# Patient Record
Sex: MALE | Race: WHITE | NOT HISPANIC OR LATINO | Employment: UNEMPLOYED | URBAN - METROPOLITAN AREA
[De-identification: names, ages, dates, MRNs, and addresses within clinical notes are randomized per-mention and may not be internally consistent; named-entity substitution may affect disease eponyms.]

---

## 2024-10-21 ENCOUNTER — HOSPITAL ENCOUNTER (OUTPATIENT)
Dept: TELEMEDICINE | Facility: HOSPITAL | Age: 69
Discharge: HOME | End: 2024-10-21
Payer: MEDICARE

## 2024-10-21 NOTE — SUBJECTIVE & OBJECTIVE
HPI:  69 y.o. male with sudden onset L arm weakness and numbness.   No signs of LVO on exam.  Onset about 2 hours prior to presentation.    Images personally reviewed and interpreted:  Study: Head CT  Study Interpretation: No acute findings     Assessment and plan:  Likely small vessel stroke  Inside lytic window, recommend tx once BP is under control.    Lytics recommendation: Recommend IV Tenecteplase 0.25mg/kg IV push (max dose 25mg); If Tenecteplase is not available use Alteplase 0.9mg/kg IV bolus followed by infusion (max dose 90mg)     BP goal prior to IV thrombolytics - <185/110 - Initiate BP management prior to treatment if not at goal    BP goal post IV thrombolytics - <180/105 for at least 24 hrs. - Continue BP management to maintain goal    Additional Recommendations:   Neurological assessment and vital signs (except temperature) every 15 minutes x 2 hours, then every 30 minutes x 6 hours, then every hour x 16 hours..  Frequency of BP assessments may need to be increased if systolic BP stays >= 180 mm Hg or diastolic BP stays >= 105 mm Hg. Administer antihypertensive meds as ordered  Temperature every 4 hours or as required.  Follow hospital protocol for further orders re: post thrombolytic therapy patient management.  No antithrombotics or anticoagulants (including but not limited to: heparin, warfarin, aspirin, clopidigrel, or dipyridamole) for 24 hours, then start antithrombotics as ordered by treating physician    Adapted from the American Heart Association/American Stroke Association (AHA/ASA) and American Association of Neuroscience Nurses (AANN) Guidelines.       Clinical Delays to Decision to Treat: None    Thrombectomy recommendation: No; at this time symptoms not suggestive of large vessel occlusion  Placement recommendation: admit to inpatient
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
## 2025-01-01 PROBLEM — R90.89 ABNORMAL CT OF BRAIN: Status: ACTIVE | Noted: 2025-01-01

## 2025-01-01 PROBLEM — I63.9 ACUTE ISCHEMIC STROKE: Status: ACTIVE | Noted: 2025-01-01

## 2025-01-03 PROBLEM — E11.9 DIABETES: Status: ACTIVE | Noted: 2025-01-03

## 2025-01-04 PROBLEM — I65.22 STENOSIS OF LEFT CAROTID ARTERY WITHOUT CEREBRAL INFARCTION: Status: ACTIVE | Noted: 2025-01-04

## 2025-01-04 PROBLEM — I63.511 ACUTE ISCHEMIC RIGHT MIDDLE CEREBRAL ARTERY (MCA) STROKE: Status: ACTIVE | Noted: 2025-01-01

## 2025-01-04 PROBLEM — I67.89 HEMIPARESIS OF LEFT NONDOMINANT SIDE DUE TO ACUTE CEREBROVASCULAR DISEASE: Status: ACTIVE | Noted: 2025-01-04

## 2025-01-04 PROBLEM — I10 PRIMARY HYPERTENSION: Status: ACTIVE | Noted: 2025-01-04

## 2025-01-04 PROBLEM — Z98.890 HISTORY OF RIGHT-SIDED CAROTID ENDARTERECTOMY: Status: ACTIVE | Noted: 2025-01-04

## 2025-01-04 PROBLEM — E03.9 HYPOTHYROIDISM: Status: ACTIVE | Noted: 2025-01-04

## 2025-01-04 PROBLEM — G81.94 HEMIPARESIS OF LEFT NONDOMINANT SIDE DUE TO ACUTE CEREBROVASCULAR DISEASE: Status: ACTIVE | Noted: 2025-01-04

## 2025-03-05 ENCOUNTER — TELEPHONE (OUTPATIENT)
Dept: TRANSPLANT | Facility: CLINIC | Age: 70
End: 2025-03-05
Payer: MEDICARE

## 2025-03-05 NOTE — TELEPHONE ENCOUNTER
----- Message from [employer] sent at 3/5/2025  2:18 PM CST -----  Hepatology referral received and scanned into media; pt chart sent to referral nurse for medical review. Referring Provider: Omar Benton DOPhone: [phone]: [phone].

## 2025-03-10 ENCOUNTER — TELEPHONE (OUTPATIENT)
Dept: TRANSPLANT | Facility: CLINIC | Age: 70
End: 2025-03-10
Payer: MEDICARE

## 2025-03-10 NOTE — TELEPHONE ENCOUNTER
Referral received from Referring Provider: Omar Benton DO   Phone: [phone]   Fax: [phone]     [facility]   ICD-10:  k75.81  Referred for liver transplant for CONSULT   Referral completed and forwarded to [facility].          Insurance: EPIC

## 2025-03-17 ENCOUNTER — TELEPHONE (OUTPATIENT)
Dept: TRANSPLANT | Facility: CLINIC | Age: 70
End: 2025-03-17
Payer: MEDICARE

## 2025-03-17 NOTE — TELEPHONE ENCOUNTER
----- Message from [employer] sent at 3/17/2025  3:42 PM CDT -----  Called pt at [phone] (Home Phone) to [facility] an appt, but there was no answer. LVM for them to call back to [facility] an appt..

## 2025-03-24 ENCOUNTER — TELEPHONE (OUTPATIENT)
Dept: TRANSPLANT | Facility: CLINIC | Age: 70
End: 2025-03-24
Payer: MEDICARE

## 2025-03-24 NOTE — TELEPHONE ENCOUNTER
----- Message from [employer] sent at 3/24/2025  3:48 PM CDT -----  Called pt at [phone] (Home Phone) to [facility] an appt, but there was no answer and unable to LVM; VM box is full.

## 2025-04-23 ENCOUNTER — TELEPHONE (OUTPATIENT)
Dept: TRANSPLANT | Facility: CLINIC | Age: 70
End: 2025-04-23
Payer: MEDICARE

## 2025-04-23 NOTE — TELEPHONE ENCOUNTER
----- Message from [employer] sent at 4/23/2025 11:15 AM CDT -----  Rec'emely call back from pt and his family about getting him [facility] a CON appt; pt sister and wife were unaware that the pt has liver dx. Told them that Omar Benton, DO ref'ed the pt to use and [place] for his liver dx. Pt sister stated that they rec'emely a call about [facility] an appt yesterday in [place] with Monik Burgos NP, but were unsure for what. Pt wife said that the pt has had quinones in the past and has dementia which makes him confused and sometimes violent. During the appt with NP they talked about his confusion due to his liver dx, but were not told that the pt had liver dx, or cirr. Pt stated that he did the labs yesterday, but has not rec'emely a call do go over the results. Pt and sister would like to see pt PCP ebfore [facility];ing an appt.

## 2025-04-23 NOTE — TELEPHONE ENCOUNTER
----- Message from Mary Carmen sent at 4/23/2025  9:15 AM CDT -----  Called pt at [phone] (Home Phone) to [facility] a appt. Pt said that he will have to talk to his siter about bringing him to [facility]- for an appt.

## 2025-06-12 ENCOUNTER — TELEPHONE (OUTPATIENT)
Dept: TRANSPLANT | Facility: CLINIC | Age: 70
End: 2025-06-12
Payer: MEDICARE
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
